# Patient Record
Sex: MALE | Race: WHITE | Employment: OTHER | URBAN - METROPOLITAN AREA
[De-identification: names, ages, dates, MRNs, and addresses within clinical notes are randomized per-mention and may not be internally consistent; named-entity substitution may affect disease eponyms.]

---

## 2017-04-13 ENCOUNTER — OFFICE VISIT (OUTPATIENT)
Dept: ORTHOPEDIC SURGERY | Age: 67
End: 2017-04-13

## 2017-04-13 VITALS
SYSTOLIC BLOOD PRESSURE: 122 MMHG | BODY MASS INDEX: 27 KG/M2 | DIASTOLIC BLOOD PRESSURE: 70 MMHG | HEIGHT: 66 IN | WEIGHT: 168 LBS

## 2017-04-13 DIAGNOSIS — M25.512 ACUTE PAIN OF LEFT SHOULDER: Primary | ICD-10-CM

## 2017-04-13 PROCEDURE — G8427 DOCREV CUR MEDS BY ELIG CLIN: HCPCS | Performed by: ORTHOPAEDIC SURGERY

## 2017-04-13 PROCEDURE — G8420 CALC BMI NORM PARAMETERS: HCPCS | Performed by: ORTHOPAEDIC SURGERY

## 2017-04-13 PROCEDURE — 1123F ACP DISCUSS/DSCN MKR DOCD: CPT | Performed by: ORTHOPAEDIC SURGERY

## 2017-04-13 PROCEDURE — 73030 X-RAY EXAM OF SHOULDER: CPT | Performed by: ORTHOPAEDIC SURGERY

## 2017-04-13 PROCEDURE — 1036F TOBACCO NON-USER: CPT | Performed by: ORTHOPAEDIC SURGERY

## 2017-04-13 PROCEDURE — 4040F PNEUMOC VAC/ADMIN/RCVD: CPT | Performed by: ORTHOPAEDIC SURGERY

## 2017-04-13 PROCEDURE — 3017F COLORECTAL CA SCREEN DOC REV: CPT | Performed by: ORTHOPAEDIC SURGERY

## 2017-04-13 PROCEDURE — 99204 OFFICE O/P NEW MOD 45 MIN: CPT | Performed by: ORTHOPAEDIC SURGERY

## 2017-04-13 RX ORDER — SIMVASTATIN 20 MG
20 TABLET ORAL NIGHTLY
COMMUNITY
Start: 2017-02-28

## 2017-04-13 RX ORDER — CLOBETASOL PROPIONATE 0.46 MG/ML
SOLUTION TOPICAL
COMMUNITY
Start: 2017-03-04

## 2017-04-13 RX ORDER — LOSARTAN POTASSIUM 25 MG/1
25 TABLET ORAL NIGHTLY
COMMUNITY
Start: 2017-04-11

## 2017-04-13 RX ORDER — CICLOPIROX 1 G/100ML
SHAMPOO TOPICAL
COMMUNITY
Start: 2017-02-14

## 2017-04-13 RX ORDER — CARVEDILOL 25 MG/1
25 TABLET ORAL 2 TIMES DAILY
COMMUNITY
Start: 2017-02-21

## 2017-04-26 ENCOUNTER — OFFICE VISIT (OUTPATIENT)
Dept: ORTHOPEDIC SURGERY | Age: 67
End: 2017-04-26

## 2017-04-26 DIAGNOSIS — M75.02 ADHESIVE CAPSULITIS OF LEFT SHOULDER: Primary | ICD-10-CM

## 2017-04-26 PROCEDURE — 3017F COLORECTAL CA SCREEN DOC REV: CPT | Performed by: ORTHOPAEDIC SURGERY

## 2017-04-26 PROCEDURE — 1123F ACP DISCUSS/DSCN MKR DOCD: CPT | Performed by: ORTHOPAEDIC SURGERY

## 2017-04-26 PROCEDURE — 4040F PNEUMOC VAC/ADMIN/RCVD: CPT | Performed by: ORTHOPAEDIC SURGERY

## 2017-04-26 PROCEDURE — G8427 DOCREV CUR MEDS BY ELIG CLIN: HCPCS | Performed by: ORTHOPAEDIC SURGERY

## 2017-04-26 PROCEDURE — G8420 CALC BMI NORM PARAMETERS: HCPCS | Performed by: ORTHOPAEDIC SURGERY

## 2017-04-26 PROCEDURE — 99214 OFFICE O/P EST MOD 30 MIN: CPT | Performed by: ORTHOPAEDIC SURGERY

## 2017-04-26 PROCEDURE — 1036F TOBACCO NON-USER: CPT | Performed by: ORTHOPAEDIC SURGERY

## 2017-04-26 RX ORDER — TRIAMTERENE AND HYDROCHLOROTHIAZIDE 37.5; 25 MG/1; MG/1
1 CAPSULE ORAL EVERY MORNING
COMMUNITY
Start: 2017-04-17

## 2017-05-01 DIAGNOSIS — M75.02 ADHESIVE CAPSULITIS OF LEFT SHOULDER: Primary | ICD-10-CM

## 2017-05-02 ENCOUNTER — TELEPHONE (OUTPATIENT)
Dept: ORTHOPEDIC SURGERY | Age: 67
End: 2017-05-02

## 2017-05-04 DIAGNOSIS — M75.02 ADHESIVE CAPSULITIS OF LEFT SHOULDER: Primary | ICD-10-CM

## 2017-05-04 RX ORDER — MELOXICAM 15 MG/1
15 TABLET ORAL DAILY
Qty: 30 TABLET | Refills: 3 | Status: SHIPPED | OUTPATIENT
Start: 2017-05-04 | End: 2017-10-16

## 2017-05-04 RX ORDER — OXYCODONE HYDROCHLORIDE 10 MG/1
10 TABLET ORAL EVERY 8 HOURS PRN
Qty: 40 TABLET | Refills: 0 | Status: SHIPPED | OUTPATIENT
Start: 2017-05-04 | End: 2017-05-11

## 2017-05-09 ENCOUNTER — HOSPITAL ENCOUNTER (OUTPATIENT)
Dept: SURGERY | Age: 67
Discharge: HOME | End: 2017-05-09
Attending: ORTHOPAEDIC SURGERY | Admitting: ORTHOPAEDIC SURGERY

## 2017-05-09 ENCOUNTER — HOSPITAL ENCOUNTER (OUTPATIENT)
Dept: PHYSICAL THERAPY | Age: 67
Discharge: HOME | End: 2017-05-09
Admitting: ORTHOPAEDIC SURGERY

## 2017-05-09 VITALS
RESPIRATION RATE: 16 BRPM | HEART RATE: 55 BPM | BODY MASS INDEX: 26.82 KG/M2 | TEMPERATURE: 97.8 F | OXYGEN SATURATION: 100 % | WEIGHT: 166.9 LBS | SYSTOLIC BLOOD PRESSURE: 97 MMHG | HEIGHT: 66 IN | DIASTOLIC BLOOD PRESSURE: 73 MMHG

## 2017-05-09 LAB
ANION GAP SERPL CALCULATED.3IONS-SCNC: 12 MMOL/L (ref 3–16)
BUN BLDV-MCNC: 16 MG/DL (ref 7–20)
CALCIUM SERPL-MCNC: 9.2 MG/DL (ref 8.3–10.6)
CHLORIDE BLD-SCNC: 99 MMOL/L (ref 99–110)
CO2: 29 MMOL/L (ref 21–32)
CREAT SERPL-MCNC: 0.8 MG/DL (ref 0.8–1.3)
GFR AFRICAN AMERICAN: >60
GFR NON-AFRICAN AMERICAN: >60
GLUCOSE BLD-MCNC: 105 MG/DL (ref 70–99)
HCT VFR BLD CALC: 41.5 % (ref 40.5–52.5)
HEMOGLOBIN: 13.8 G/DL (ref 13.5–17.5)
MCH RBC QN AUTO: 30.9 PG (ref 26–34)
MCHC RBC AUTO-ENTMCNC: 33.2 G/DL (ref 31–36)
MCV RBC AUTO: 93 FL (ref 80–100)
PDW BLD-RTO: 14.6 % (ref 12.4–15.4)
PLATELET # BLD: 214 K/UL (ref 135–450)
PMV BLD AUTO: 7.4 FL (ref 5–10.5)
POTASSIUM SERPL-SCNC: 3.8 MMOL/L (ref 3.5–5.1)
RBC # BLD: 4.47 M/UL (ref 4.2–5.9)
SODIUM BLD-SCNC: 140 MMOL/L (ref 136–145)
WBC # BLD: 6.9 K/UL (ref 4–11)

## 2017-05-09 RX ORDER — SODIUM CHLORIDE 9 MG/ML
INJECTION, SOLUTION INTRAVENOUS CONTINUOUS
Status: DISCONTINUED | OUTPATIENT
Start: 2017-05-09 | End: 2017-05-10 | Stop reason: HOSPADM

## 2017-05-09 RX ORDER — ACETAMINOPHEN 325 MG/1
650 TABLET ORAL EVERY 4 HOURS PRN
Status: DISCONTINUED | OUTPATIENT
Start: 2017-05-09 | End: 2017-05-10 | Stop reason: HOSPADM

## 2017-05-09 RX ORDER — ONDANSETRON 2 MG/ML
4 INJECTION INTRAMUSCULAR; INTRAVENOUS
Status: ACTIVE | OUTPATIENT
Start: 2017-05-09 | End: 2017-05-09

## 2017-05-09 RX ORDER — HYDROCODONE BITARTRATE AND ACETAMINOPHEN 5; 325 MG/1; MG/1
1 TABLET ORAL
Status: ACTIVE | OUTPATIENT
Start: 2017-05-09 | End: 2017-05-09

## 2017-05-09 RX ORDER — LIDOCAINE HYDROCHLORIDE 10 MG/ML
1 INJECTION, SOLUTION EPIDURAL; INFILTRATION; INTRACAUDAL; PERINEURAL
Status: ACTIVE | OUTPATIENT
Start: 2017-05-09 | End: 2017-05-09

## 2017-05-09 RX ORDER — SODIUM CHLORIDE, SODIUM LACTATE, POTASSIUM CHLORIDE, CALCIUM CHLORIDE 600; 310; 30; 20 MG/100ML; MG/100ML; MG/100ML; MG/100ML
INJECTION, SOLUTION INTRAVENOUS CONTINUOUS
Status: DISCONTINUED | OUTPATIENT
Start: 2017-05-09 | End: 2017-05-10 | Stop reason: HOSPADM

## 2017-05-09 RX ORDER — CEFAZOLIN SODIUM 2 G/100ML
2 INJECTION, SOLUTION INTRAVENOUS
Status: COMPLETED | OUTPATIENT
Start: 2017-05-09 | End: 2017-05-09

## 2017-05-09 RX ORDER — HYDROMORPHONE HCL 110MG/55ML
0.25 PATIENT CONTROLLED ANALGESIA SYRINGE INTRAVENOUS EVERY 5 MIN PRN
Status: DISCONTINUED | OUTPATIENT
Start: 2017-05-09 | End: 2017-05-10 | Stop reason: HOSPADM

## 2017-05-09 RX ORDER — HYDROMORPHONE HCL 110MG/55ML
0.5 PATIENT CONTROLLED ANALGESIA SYRINGE INTRAVENOUS EVERY 5 MIN PRN
Status: DISCONTINUED | OUTPATIENT
Start: 2017-05-09 | End: 2017-05-10 | Stop reason: HOSPADM

## 2017-05-09 RX ORDER — OXYCODONE HYDROCHLORIDE 5 MG/1
5 TABLET ORAL
Status: COMPLETED | OUTPATIENT
Start: 2017-05-09 | End: 2017-05-09

## 2017-05-09 RX ADMIN — Medication 0.5 MG: at 08:54

## 2017-05-09 RX ADMIN — SODIUM CHLORIDE, SODIUM LACTATE, POTASSIUM CHLORIDE, CALCIUM CHLORIDE: 600; 310; 30; 20 INJECTION, SOLUTION INTRAVENOUS at 06:46

## 2017-05-09 RX ADMIN — CEFAZOLIN SODIUM 2 G: 2 INJECTION, SOLUTION INTRAVENOUS at 06:55

## 2017-05-09 RX ADMIN — OXYCODONE HYDROCHLORIDE 5 MG: 5 TABLET ORAL at 09:24

## 2017-05-09 RX ADMIN — Medication 0.5 MG: at 08:40

## 2017-05-09 ASSESSMENT — PAIN SCALES - GENERAL
PAINLEVEL_OUTOF10: 8
PAINLEVEL_OUTOF10: 5
PAINLEVEL_OUTOF10: 2

## 2017-05-09 ASSESSMENT — PAIN DESCRIPTION - DESCRIPTORS: DESCRIPTORS: ACHING

## 2017-05-09 ASSESSMENT — PAIN DESCRIPTION - PAIN TYPE
TYPE: ACUTE PAIN
TYPE: SURGICAL PAIN
TYPE: SURGICAL PAIN

## 2017-05-09 ASSESSMENT — PAIN DESCRIPTION - ORIENTATION: ORIENTATION: LEFT

## 2017-05-09 ASSESSMENT — PAIN DESCRIPTION - LOCATION: LOCATION: SHOULDER

## 2017-05-09 ASSESSMENT — PAIN - FUNCTIONAL ASSESSMENT: PAIN_FUNCTIONAL_ASSESSMENT: 0-10

## 2017-05-10 ENCOUNTER — HOSPITAL ENCOUNTER (OUTPATIENT)
Dept: PHYSICAL THERAPY | Age: 67
Discharge: HOME | End: 2017-05-10
Admitting: ORTHOPAEDIC SURGERY

## 2017-05-11 ENCOUNTER — HOSPITAL ENCOUNTER (OUTPATIENT)
Dept: PHYSICAL THERAPY | Age: 67
Discharge: HOME | End: 2017-05-11
Admitting: ORTHOPAEDIC SURGERY

## 2017-05-12 ENCOUNTER — HOSPITAL ENCOUNTER (OUTPATIENT)
Dept: PHYSICAL THERAPY | Age: 67
Discharge: HOME | End: 2017-05-12
Admitting: ORTHOPAEDIC SURGERY

## 2017-05-15 ENCOUNTER — HOSPITAL ENCOUNTER (OUTPATIENT)
Dept: PHYSICAL THERAPY | Age: 67
Discharge: HOME | End: 2017-05-15
Admitting: ORTHOPAEDIC SURGERY

## 2017-05-17 ENCOUNTER — OFFICE VISIT (OUTPATIENT)
Dept: ORTHOPEDIC SURGERY | Age: 67
End: 2017-05-17

## 2017-05-17 ENCOUNTER — HOSPITAL ENCOUNTER (OUTPATIENT)
Dept: PHYSICAL THERAPY | Age: 67
Discharge: HOME | End: 2017-05-17
Admitting: ORTHOPAEDIC SURGERY

## 2017-05-17 DIAGNOSIS — M75.02 ADHESIVE CAPSULITIS OF LEFT SHOULDER: Primary | ICD-10-CM

## 2017-05-17 PROCEDURE — 99024 POSTOP FOLLOW-UP VISIT: CPT | Performed by: ORTHOPAEDIC SURGERY

## 2017-05-19 ENCOUNTER — HOSPITAL ENCOUNTER (OUTPATIENT)
Dept: PHYSICAL THERAPY | Age: 67
Discharge: HOME | End: 2017-05-19
Admitting: ORTHOPAEDIC SURGERY

## 2017-05-22 ENCOUNTER — HOSPITAL ENCOUNTER (OUTPATIENT)
Dept: PHYSICAL THERAPY | Age: 67
Discharge: HOME | End: 2017-05-22
Admitting: ORTHOPAEDIC SURGERY

## 2017-05-24 ENCOUNTER — HOSPITAL ENCOUNTER (OUTPATIENT)
Dept: PHYSICAL THERAPY | Age: 67
Discharge: HOME | End: 2017-05-24
Admitting: ORTHOPAEDIC SURGERY

## 2017-05-26 ENCOUNTER — HOSPITAL ENCOUNTER (OUTPATIENT)
Dept: PHYSICAL THERAPY | Age: 67
Discharge: HOME | End: 2017-05-26
Admitting: ORTHOPAEDIC SURGERY

## 2017-05-30 ENCOUNTER — HOSPITAL ENCOUNTER (OUTPATIENT)
Dept: PHYSICAL THERAPY | Age: 67
Discharge: HOME | End: 2017-05-30
Admitting: ORTHOPAEDIC SURGERY

## 2017-06-05 ENCOUNTER — OFFICE VISIT (OUTPATIENT)
Dept: ORTHOPEDIC SURGERY | Age: 67
End: 2017-06-05

## 2017-06-05 VITALS
WEIGHT: 168 LBS | BODY MASS INDEX: 27 KG/M2 | DIASTOLIC BLOOD PRESSURE: 70 MMHG | HEIGHT: 66 IN | SYSTOLIC BLOOD PRESSURE: 124 MMHG

## 2017-06-05 DIAGNOSIS — M25.522 LEFT ELBOW PAIN: Primary | ICD-10-CM

## 2017-06-05 PROCEDURE — 4040F PNEUMOC VAC/ADMIN/RCVD: CPT | Performed by: ORTHOPAEDIC SURGERY

## 2017-06-05 PROCEDURE — 1123F ACP DISCUSS/DSCN MKR DOCD: CPT | Performed by: ORTHOPAEDIC SURGERY

## 2017-06-05 PROCEDURE — G8427 DOCREV CUR MEDS BY ELIG CLIN: HCPCS | Performed by: ORTHOPAEDIC SURGERY

## 2017-06-05 PROCEDURE — 3017F COLORECTAL CA SCREEN DOC REV: CPT | Performed by: ORTHOPAEDIC SURGERY

## 2017-06-05 PROCEDURE — 73070 X-RAY EXAM OF ELBOW: CPT | Performed by: ORTHOPAEDIC SURGERY

## 2017-06-05 PROCEDURE — 99214 OFFICE O/P EST MOD 30 MIN: CPT | Performed by: ORTHOPAEDIC SURGERY

## 2017-06-05 PROCEDURE — G8420 CALC BMI NORM PARAMETERS: HCPCS | Performed by: ORTHOPAEDIC SURGERY

## 2017-06-05 PROCEDURE — 1036F TOBACCO NON-USER: CPT | Performed by: ORTHOPAEDIC SURGERY

## 2017-06-05 RX ORDER — PREDNISONE 10 MG/1
TABLET ORAL
Qty: 30 TABLET | Refills: 0 | Status: SHIPPED | OUTPATIENT
Start: 2017-06-05 | End: 2017-10-16

## 2017-06-07 ENCOUNTER — HOSPITAL ENCOUNTER (OUTPATIENT)
Dept: PHYSICAL THERAPY | Age: 67
Discharge: HOME | End: 2017-06-07
Admitting: ORTHOPAEDIC SURGERY

## 2017-06-09 ENCOUNTER — HOSPITAL ENCOUNTER (OUTPATIENT)
Dept: PHYSICAL THERAPY | Age: 67
Discharge: HOME | End: 2017-06-09
Admitting: ORTHOPAEDIC SURGERY

## 2017-06-13 ENCOUNTER — HOSPITAL ENCOUNTER (OUTPATIENT)
Dept: PHYSICAL THERAPY | Age: 67
Discharge: HOME | End: 2017-06-13
Admitting: ORTHOPAEDIC SURGERY

## 2017-06-14 DIAGNOSIS — M75.02 ADHESIVE CAPSULITIS OF LEFT SHOULDER: Primary | ICD-10-CM

## 2017-06-14 RX ORDER — HYDROCODONE BITARTRATE AND ACETAMINOPHEN 5; 325 MG/1; MG/1
1 TABLET ORAL EVERY 8 HOURS PRN
Qty: 20 TABLET | Refills: 0 | Status: SHIPPED | OUTPATIENT
Start: 2017-06-14 | End: 2017-06-21

## 2017-06-16 ENCOUNTER — HOSPITAL ENCOUNTER (OUTPATIENT)
Dept: PHYSICAL THERAPY | Age: 67
Discharge: HOME | End: 2017-06-16
Admitting: ORTHOPAEDIC SURGERY

## 2017-06-22 ENCOUNTER — HOSPITAL ENCOUNTER (OUTPATIENT)
Dept: PHYSICAL THERAPY | Age: 67
Discharge: HOME | End: 2017-06-22
Admitting: ORTHOPAEDIC SURGERY

## 2017-07-10 ENCOUNTER — OFFICE VISIT (OUTPATIENT)
Dept: ORTHOPEDIC SURGERY | Age: 67
End: 2017-07-10

## 2017-07-10 DIAGNOSIS — M75.02 ADHESIVE CAPSULITIS OF LEFT SHOULDER: Primary | ICD-10-CM

## 2017-07-10 PROCEDURE — G8419 CALC BMI OUT NRM PARAM NOF/U: HCPCS | Performed by: ORTHOPAEDIC SURGERY

## 2017-07-10 PROCEDURE — 4040F PNEUMOC VAC/ADMIN/RCVD: CPT | Performed by: ORTHOPAEDIC SURGERY

## 2017-07-10 PROCEDURE — 3017F COLORECTAL CA SCREEN DOC REV: CPT | Performed by: ORTHOPAEDIC SURGERY

## 2017-07-10 PROCEDURE — 99213 OFFICE O/P EST LOW 20 MIN: CPT | Performed by: ORTHOPAEDIC SURGERY

## 2017-07-10 PROCEDURE — 1123F ACP DISCUSS/DSCN MKR DOCD: CPT | Performed by: ORTHOPAEDIC SURGERY

## 2017-07-10 PROCEDURE — G8427 DOCREV CUR MEDS BY ELIG CLIN: HCPCS | Performed by: ORTHOPAEDIC SURGERY

## 2017-07-10 PROCEDURE — 1036F TOBACCO NON-USER: CPT | Performed by: ORTHOPAEDIC SURGERY

## 2017-07-11 ENCOUNTER — HOSPITAL ENCOUNTER (OUTPATIENT)
Dept: PHYSICAL THERAPY | Age: 67
Discharge: HOME | End: 2017-07-11
Admitting: ORTHOPAEDIC SURGERY

## 2017-07-26 ENCOUNTER — HOSPITAL ENCOUNTER (OUTPATIENT)
Dept: PHYSICAL THERAPY | Age: 67
Discharge: HOME | End: 2017-07-26
Admitting: ORTHOPAEDIC SURGERY

## 2017-08-02 ENCOUNTER — HOSPITAL ENCOUNTER (OUTPATIENT)
Dept: PHYSICAL THERAPY | Age: 67
Discharge: HOME | End: 2017-08-02
Admitting: ORTHOPAEDIC SURGERY

## 2017-08-17 ENCOUNTER — HOSPITAL ENCOUNTER (OUTPATIENT)
Dept: PHYSICAL THERAPY | Age: 67
Discharge: HOME | End: 2017-08-17
Admitting: ORTHOPAEDIC SURGERY

## 2017-08-23 ENCOUNTER — OFFICE VISIT (OUTPATIENT)
Dept: ORTHOPEDIC SURGERY | Age: 67
End: 2017-08-23

## 2017-08-23 VITALS — BODY MASS INDEX: 27 KG/M2 | WEIGHT: 168 LBS | HEIGHT: 66 IN

## 2017-08-23 DIAGNOSIS — M19.012 GLENOHUMERAL ARTHRITIS, LEFT: Primary | ICD-10-CM

## 2017-08-23 PROCEDURE — G8427 DOCREV CUR MEDS BY ELIG CLIN: HCPCS | Performed by: ORTHOPAEDIC SURGERY

## 2017-08-23 PROCEDURE — G8419 CALC BMI OUT NRM PARAM NOF/U: HCPCS | Performed by: ORTHOPAEDIC SURGERY

## 2017-08-23 PROCEDURE — 99213 OFFICE O/P EST LOW 20 MIN: CPT | Performed by: ORTHOPAEDIC SURGERY

## 2017-08-23 PROCEDURE — 3017F COLORECTAL CA SCREEN DOC REV: CPT | Performed by: ORTHOPAEDIC SURGERY

## 2017-08-23 PROCEDURE — 4040F PNEUMOC VAC/ADMIN/RCVD: CPT | Performed by: ORTHOPAEDIC SURGERY

## 2017-08-23 PROCEDURE — 1123F ACP DISCUSS/DSCN MKR DOCD: CPT | Performed by: ORTHOPAEDIC SURGERY

## 2017-08-23 PROCEDURE — 1036F TOBACCO NON-USER: CPT | Performed by: ORTHOPAEDIC SURGERY

## 2017-08-23 RX ORDER — CALCIPOTRIENE 0.05 MG/ML
SOLUTION TOPICAL
COMMUNITY
Start: 2017-08-07

## 2017-08-23 RX ORDER — SERTRALINE HYDROCHLORIDE 100 MG/1
TABLET, FILM COATED ORAL DAILY
COMMUNITY
Start: 2017-06-28

## 2017-08-23 RX ORDER — HYDROCODONE BITARTRATE AND ACETAMINOPHEN 5; 325 MG/1; MG/1
TABLET ORAL
COMMUNITY
Start: 2017-06-22 | End: 2017-10-16

## 2017-09-18 ENCOUNTER — OFFICE VISIT (OUTPATIENT)
Dept: ORTHOPEDIC SURGERY | Age: 67
End: 2017-09-18

## 2017-09-18 VITALS
BODY MASS INDEX: 25.9 KG/M2 | WEIGHT: 165 LBS | HEART RATE: 79 BPM | HEIGHT: 67 IN | SYSTOLIC BLOOD PRESSURE: 122 MMHG | DIASTOLIC BLOOD PRESSURE: 70 MMHG

## 2017-09-18 DIAGNOSIS — M19.90 ARTHRITIS: ICD-10-CM

## 2017-09-18 PROCEDURE — G8417 CALC BMI ABV UP PARAM F/U: HCPCS | Performed by: PHYSICIAN ASSISTANT

## 2017-09-18 PROCEDURE — L3670 SO ACRO/CLAV CAN WEB PRE OTS: HCPCS | Performed by: PHYSICIAN ASSISTANT

## 2017-09-18 PROCEDURE — 4040F PNEUMOC VAC/ADMIN/RCVD: CPT | Performed by: PHYSICIAN ASSISTANT

## 2017-09-18 PROCEDURE — 3017F COLORECTAL CA SCREEN DOC REV: CPT | Performed by: PHYSICIAN ASSISTANT

## 2017-09-18 PROCEDURE — 1036F TOBACCO NON-USER: CPT | Performed by: PHYSICIAN ASSISTANT

## 2017-09-18 PROCEDURE — 99214 OFFICE O/P EST MOD 30 MIN: CPT | Performed by: PHYSICIAN ASSISTANT

## 2017-09-18 PROCEDURE — G8427 DOCREV CUR MEDS BY ELIG CLIN: HCPCS | Performed by: PHYSICIAN ASSISTANT

## 2017-10-13 LAB
ABO/RH: NORMAL
ANTIBODY SCREEN: NORMAL
APTT: 30.7 SEC (ref 24.1–34.9)
BASOPHILS ABSOLUTE: 0.1 K/UL (ref 0–0.2)
BASOPHILS RELATIVE PERCENT: 0.9 %
BILIRUBIN URINE: NEGATIVE
BLOOD, URINE: NEGATIVE
CLARITY: CLEAR
COLOR: YELLOW
EKG ATRIAL RATE: 51 BPM
EKG DIAGNOSIS: NORMAL
EKG P AXIS: 41 DEGREES
EKG P-R INTERVAL: 156 MS
EKG Q-T INTERVAL: 422 MS
EKG QRS DURATION: 78 MS
EKG QTC CALCULATION (BAZETT): 388 MS
EKG R AXIS: 6 DEGREES
EKG T AXIS: 90 DEGREES
EKG VENTRICULAR RATE: 51 BPM
EOSINOPHILS ABSOLUTE: 0.7 K/UL (ref 0–0.6)
EOSINOPHILS RELATIVE PERCENT: 9.3 %
GLUCOSE URINE: NEGATIVE MG/DL
HCT VFR BLD CALC: 42.2 % (ref 40.5–52.5)
HEMOGLOBIN: 14.1 G/DL (ref 13.5–17.5)
INR BLD: 1.01 (ref 0.85–1.15)
KETONES, URINE: NEGATIVE MG/DL
LEUKOCYTE ESTERASE, URINE: NEGATIVE
LYMPHOCYTES ABSOLUTE: 2.1 K/UL (ref 1–5.1)
LYMPHOCYTES RELATIVE PERCENT: 26.2 %
MCH RBC QN AUTO: 31 PG (ref 26–34)
MCHC RBC AUTO-ENTMCNC: 33.5 G/DL (ref 31–36)
MCV RBC AUTO: 92.5 FL (ref 80–100)
MICROSCOPIC EXAMINATION: NORMAL
MONOCYTES ABSOLUTE: 0.5 K/UL (ref 0–1.3)
MONOCYTES RELATIVE PERCENT: 6.3 %
NEUTROPHILS ABSOLUTE: 4.5 K/UL (ref 1.7–7.7)
NEUTROPHILS RELATIVE PERCENT: 57.3 %
NITRITE, URINE: NEGATIVE
PDW BLD-RTO: 14.1 % (ref 12.4–15.4)
PH UA: 7
PLATELET # BLD: 219 K/UL (ref 135–450)
PMV BLD AUTO: 7.7 FL (ref 5–10.5)
PROTEIN UA: NEGATIVE MG/DL
PROTHROMBIN TIME: 11.4 SEC (ref 9.6–13)
RBC # BLD: 4.57 M/UL (ref 4.2–5.9)
SPECIFIC GRAVITY UA: 1.01
URINE TYPE: NORMAL
UROBILINOGEN, URINE: 0.2 E.U./DL
WBC # BLD: 7.9 K/UL (ref 4–11)

## 2017-10-14 LAB — URINE CULTURE, ROUTINE: NORMAL

## 2017-10-23 ENCOUNTER — HOSPITAL ENCOUNTER (OUTPATIENT)
Dept: SURGERY | Age: 67
Discharge: HOME | End: 2017-10-13
Attending: ORTHOPAEDIC SURGERY | Admitting: ORTHOPAEDIC SURGERY

## 2017-10-23 PROBLEM — I10 HTN (HYPERTENSION): Status: ACTIVE | Noted: 2017-10-23

## 2017-10-23 PROBLEM — E78.5 HLD (HYPERLIPIDEMIA): Status: ACTIVE | Noted: 2017-10-23

## 2017-10-23 PROBLEM — I25.10 CAD (CORONARY ARTERY DISEASE): Status: ACTIVE | Noted: 2017-10-23

## 2017-10-24 PROBLEM — Z96.612 STATUS POST TOTAL REPLACEMENT OF LEFT SHOULDER: Status: ACTIVE | Noted: 2017-10-24

## 2017-11-03 ENCOUNTER — OFFICE VISIT (OUTPATIENT)
Dept: ORTHOPEDIC SURGERY | Age: 67
End: 2017-11-03

## 2017-11-03 VITALS — BODY MASS INDEX: 27.85 KG/M2 | HEIGHT: 67 IN | WEIGHT: 177.47 LBS

## 2017-11-03 DIAGNOSIS — Z96.612 STATUS POST TOTAL REPLACEMENT OF LEFT SHOULDER: Primary | ICD-10-CM

## 2017-11-03 DIAGNOSIS — Z96.612 HISTORY OF TOTAL REPLACEMENT OF LEFT SHOULDER JOINT: ICD-10-CM

## 2017-11-03 PROCEDURE — 99024 POSTOP FOLLOW-UP VISIT: CPT | Performed by: ORTHOPAEDIC SURGERY

## 2017-11-08 ENCOUNTER — HOSPITAL ENCOUNTER (OUTPATIENT)
Dept: PHYSICAL THERAPY | Age: 67
Discharge: HOME | End: 2017-11-30
Admitting: ORTHOPAEDIC SURGERY

## 2017-11-08 NOTE — FLOWSHEET NOTE
[address] [facility] [address] [facility]  Phone: [phone] Fax: [phone]      Physical Therapy Daily Treatment Note  Date:  2017    Patient Name:  Dutch Tesfaye    :  1950  MRN: [id number]  Restrictions/Precautions:    Medical/Treatment Diagnosis Information:  · Diagnosis: s/p L shoulder  · Treatment Diagnosis: status post total replacement of left shoulder Z96.612, I20.30  Insurance/Certification information:  PT Insurance Information: Bertin Stuart [id number]  Physician Information:  Referring Practitioner: Dr. Karolina Red of care signed (Y/N):     Date of Patient follow up with Physician:     G-Code (if applicable):      Date G-Code Applied:    PT G-Codes  Functional Assessment Tool Used: QuickDASH  Score: 56.81%   Functional Limitation: Carrying, moving and handling objects  Carrying, Moving and Handling Objects Current Status (): At least 40 percent but less than 60 percent impaired, limited or restricted  Carrying, Moving and Handling Objects Goal Status ():  At least 20 percent but less than 40 percent impaired, limited or restricted    Progress Note: [x]  Yes  []  No  Next due by: Visit #10      Latex Allergy:  [x]NO      []YES  Preferred Language for Healthcare:   [x]English       []other:    Visit # Insurance Allowable   1 Medicare/[facility] 1807.     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    Exercises/Interventions:   Therapeutic Ex Sets/sec Reps Notes   UBE      Cane AAROM flex/press      3 way Isomet      T- band Row/pinch      T- band lower pinch      T- band ER activation      Supine SA punch      SL ER      Prone Rows/HAB/ext      Seat Table slides/Wall Slides 10s 10    Seated HH  Depression      No Money      Standing flex/scap      Standing Punch      Lawnmower      isometric scap squeeze 5s 20                      Manual Intervention      Shld /GH Mobs 10     Post Cap mobs      Thoracic/Rib manipualtion      CT MT/Mobs      PROM MT      Elbow mobs            NMR re-education      T-spine Ext      GH depress/compress      Scap/GH NMR      Body blade      Wall ball roll      Wall Ball bounce      Ball drops      Nancy Scap Bio          Therapeutic Exercise and NMR EXR  [x] (69650) Provided verbal/tactile cueing for activities related to strengthening, flexibility, endurance, ROM  for improvements in scapular, scapulothoracic and UE control with self care, reaching, carrying, lifting, house/yardwork, driving/computer work.    [] (81806) Provided verbal/tactile cueing for activities related to improving balance, coordination, kinesthetic sense, posture, motor skill, proprioception  to assist with  scapular, scapulothoracic and UE control with self care, reaching, carrying, lifting, house/yardwork, driving/computer work. Therapeutic Activities:    [] (73452 or 64116) Provided verbal/tactile cueing for activities related to improving balance, coordination, kinesthetic sense, posture, motor skill, proprioception and motor activation to allow for proper function of scapular, scapulothoracic and UE control with self care, carrying, lifting, driving/computer work.      Home Exercise Program:    [x] (16060) Reviewed/Progressed HEP activities related to strengthening, flexibility, endurance, ROM of scapular, scapulothoracic and UE control with self care, reaching, carrying, lifting, house/yardwork, driving/computer work  [] (64174) Reviewed/Progressed HEP activities related to improving balance, coordination, kinesthetic sense, posture, motor skill, proprioception of scapular, scapulothoracic and UE control with self care, reaching, carrying, lifting, house/yardwork, driving/computer work      Manual Treatments:  PROM / STM / Oscillations-Mobs:  G-I, II, III, IV (PA's, Inf., Post.)  [x] (79057) weeks     Progression Towards Functional goals:  [] Patient is progressing as expected towards functional goals listed. [] Progression is slowed due to complexities listed. [] Progression has been slowed due to co-morbidities.   [x] Plan just implemented, too soon to assess goals progression  [] Other:     ASSESSMENT:  See eval    Return to Play: (if applicable)   []  Stage 1: Intro to Strength   []  Stage 2: Dynamic Strength and Intro to Plyometrics   []  Stage 3: Advanced Plyometrics and Intro to Throwing   []  Stage 4: Sport specific Training/Return to Sport     []  Ready to Return to Play, [employer]   []  Not Ready for Return to Sports   Comments:      Treatment/Activity Tolerance:  [x] Patient tolerated treatment well [] Patient limited by fatique  [] Patient limited by pain  [] Patient limited by other medical complications  [] Other:     Prognosis: [x] Good [] Fair  [] Poor    Patient Requires Follow-up: [x] Yes  [] No    PLAN: See eval  [] Continue per plan of care [] Alter current plan (see comments)  [x] Plan of care initiated [] Hold pending MD visit [] Discharge    Electronically signed by: Angelito Vail PT

## 2017-11-08 NOTE — PROGRESS NOTES
Disability Index:PT G-Codes  Functional Assessment Tool Used: QuickDASH  Score: 56.81%   Functional Limitation: Carrying, moving and handling objects  Carrying, Moving and Handling Objects Current Status (): At least 40 percent but less than 60 percent impaired, limited or restricted  Carrying, Moving and Handling Objects Goal Status (): At least 20 percent but less than 40 percent impaired, limited or restricted    Pain Scale: 5-6/10  Easing factors: rest, ice, medication  Provocative factors: sleep, LUE use for daily functions, driving, OH reach, golf     Type: []Constant   [x]Intermittent  []Radiating [x]Localized []other:     Numbness/Tingling: n/a    Occupation/School: business owner of curtain store    Living Status/Prior Level of Function: Independent with ADLs and IADLs, improve LUE daily functional activities, recreational activities, driving, UE OH reach and use for increased physical activities including work duties and golf. OBJECTIVE:     CERV ROM     Cervical Flexion     Cervical Extension     Cervical SB     Cervical rotation          ROM Left(passive) Right   Shoulder Flex 95    Shoulder Abd 80    Shoulder ER 14    Shoulder IR 0                   Strength  Left Right   Shoulder Flex nt    Shoulder Scap nt    Shoulder ER nt    Shoulder IR nt                Reflexes/Sensation:    [x]Dermatomes/Myotomes intact    [x]Reflexes equal and normal bilaterally   []Other:    Joint mobility:    []Normal    [x]Hypo   []Hyper    Palpation: tender along incision sites, anterior shoulder    Functional Mobility/Transfers: none    Posture: rounded shoulders    Bandages/Dressings/Incisions: incision sites appear intact and dry without signs of infection    Gait: (include devices/WB status): WNL    Orthopedic Special Tests:  nt                       [x] Patient history, allergies, meds reviewed. Medical chart reviewed. See intake form.      Review Of Systems (ROS):  [x]Performed Review of systems Handling Objects Current Status (): At least 40 percent but less than 60 percent impaired, limited or restricted  Carrying, Moving and Handling Objects Goal Status (): At least 20 percent but less than 40 percent impaired, limited or restricted    ASSESSMENT: Pt demonstrates s/p L shoulder mobility deficits, strength deficits, proprioceptive impairment and overall decreased functional activity tolerance. Pt benefits from manual treatment techniques and education for gradual progressions. Discussed pathology and improving overall tissue healing with mobility in all planes managing pain and effusion. Functional Impairments   [x]Noted spinal or UE joint hypomobility   []Noted spinal or UE joint hypermobility   [x]Decreased UE functional ROM   [x]Decreased UE functional strength   [x]Abnormal reflexes/sensation/myotomal/dermatomal deficits   [x]Decreased RC/scapular/core strength and neuromuscular control   []other:      Functional Activity Limitations (from functional questionnaire and intake)   [x]Reduced ability to tolerate prolonged functional positions   [x]Reduced ability or difficulty with changes of positions or transfers between positions   [x]Reduced ability to maintain good posture and demonstrate good body mechanics with sitting, bending, and lifting   [x] Reduced ability or tolerance with driving and/or computer work   [x]Reduced ability to sleep   [x]Reduced ability to perform lifting, reaching, carrying tasks   [x]Reduced ability to tolerate impact through UE   [x]Reduced ability to reach behind back   [x]Reduced ability to  or hold objects   [x]Reduced ability to throw or toss an object   []other:      Participation Restrictions   [x]Reduced participation in self care activities   [x]Reduced participation in home management activities   [x]Reduced participation in work activities   [x]Reduced participation in social activities.    [x]Reduced participation in sport / recreational activities. Classification:   [x]Signs/symptoms consistent with post-surgical status including decreased ROM, strength and function. []Signs/symptoms consistent with joint sprain/strain   []Signs/symptoms consistent with shoulder impingement   []Signs/symptoms consistent with shoulder/elbow/wrist tendinopathy   []Signs/symptoms consistent with Rotator cuff tear   []Signs/symptoms consistent with labral tear   []Signs/symptoms consistent with postural dysfunction    []Signs/symptoms consistent with Glenohumeral IR Deficit - <45 degrees   [x]Signs/symptoms consistent with facet dysfunction of cervical/thoracic spine    []Signs/symptoms consistent with pathology which may benefit from Dry needling     []other:     Prognosis/Rehab Potential:      []Excellent   [x]Good    []Fair   []Poor    Tolerance of evaluation/treatment:    []Excellent   [x]Good    []Fair   []Poor    Physical Therapy Evaluation Complexity Justification  [x] A history of present problem with:  [] no personal factors and/or comorbidities that impact the plan of care;  [x]1-2 personal factors and/or comorbidities that impact the plan of care  []3 personal factors and/or comorbidities that impact the plan of care  [x] An examination of body systems using standardized tests and measures addressing any of the following: body structures and functions (impairments), activity limitations, and/or participation restrictions;:  [x] a total of 1-2 or more elements   [] a total of 3 or more elements   [] a total of 4 or more elements   [x] A clinical presentation with:  [] stable and/or uncomplicated characteristics   [x] evolving clinical presentation with changing characteristics  [] unstable and unpredictable characteristics;   [x] Clinical decision making of [] low, [x] moderate, [] high complexity using standardized patient assessment instrument and/or measurable assessment of functional outcome.     [] EVAL (LOW) 18170 (typically 20 minutes

## 2017-11-10 ENCOUNTER — HOSPITAL ENCOUNTER (OUTPATIENT)
Dept: PHYSICAL THERAPY | Age: 67
Discharge: HOME | End: 2017-11-10
Admitting: ORTHOPAEDIC SURGERY

## 2017-11-10 NOTE — FLOWSHEET NOTE
[address] [address] [address] [facility], [place] [address]  Phone: [phone] Fax: [phone]      Physical Therapy Daily Treatment Note  Date:  11/10/2017    Patient Name:  Angelo Lawrence    :  1950  MRN: [id number]  Restrictions/Precautions:    Medical/Treatment Diagnosis Information:  · Diagnosis: s/p L shoulder  · Treatment Diagnosis: status post total replacement of left shoulder Z96.612, S88.34  Insurance/Certification information:  PT Insurance Information: Bertin Stuart 142  Physician Information:  Referring Practitioner: Dr. Gloria Doe of care signed (Y/N):     Date of Patient follow up with Physician:     G-Code (if applicable):      Date G-Code Applied:         Progress Note: [x]  Yes  []  No  Next due by: Visit #10      Latex Allergy:  [x]NO      []YES  Preferred Language for Healthcare:   [x]English       []other:    Visit # Insurance Allowable   2 Medicare/[facility] 180     Pain level:  5/10     SUBJECTIVE:  Pt reports \"felt so good after evaluation\".  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    Exercises/Interventions:   Therapeutic Ex Sets/sec Reps Notes   UBE      Cane AAROM flex/press 5s 10    3 way Isomet      T- band Row/pinch      T- band lower pinch      T- band ER activation      Supine SA punch      SL ER      Prone Rows/HAB/ext      Seat Table slides/Wall Slides 10s 10    Seated HH  Depression      No Money      Standing flex/scap      Standing Punch      Lawnmower      isometric scap squeeze 5s 20                      Manual Intervention      Shld /GH Mobs 5     Post Cap mobs      Thoracic/Rib manipualtion      CT MT/Mobs      PROM MT 10     Elbow mobs            NMR re-education      T-spine Ext      GH depress/compress      Scap/GH NMR      Body blade      Wall ball roll      Wall Ball bounce      Ball drops reaching, carrying, lifting, house/yardwork, driving/computer work    Modalities:  Cold pack 10'    Charges:  Timed Code Treatment Minutes: 34   Total Treatment Minutes: 44     [] EVAL (LOW) 36745 (typically 20 minutes face-to-face)  [] EVAL (MOD) 41396 (typically 30 minutes face-to-face)  [] EVAL (HIGH) 53924 (typically 45 minutes face-to-face)  [] RE-EVAL     [x] TA(28731) x  1   [] IONTO  [] NMR (28089) x      [] VASO  [x] Manual (71012) x  1    [] Other:  [] TA x       [] Mech Traction (20120)  [] ES(attended) (01253)      [] ES (un) (95013):     GOALS:  Therapist goals for Patient:   Short Term Goals: To be achieved in: 2 weeks  1. Independent in HEP and progression per patient tolerance, in order to prevent re-injury. 2. Patient will have a decrease in pain to facilitate improvement in movement, function, and ADLs as indicated by Functional Deficits. Long Term Goals: To be achieved in: 6-8 weeks  1. Disability index score of 25% or less for the DASH to assist with reaching prior level of function. (IE 54.81%)  2. Patient will demonstrate increased AROM to symmetrical within 5 degrees to allow for proper joint functioning as indicated by patients Functional Deficits. 3. Patient will demonstrate an increase in Strength to good scapular and core control, w/in 5lbs HHD for UE to allow for proper functional mobility as indicated by patients Functional Deficits. 4. Patient will return to increased physical and daily functional activities without increased symptoms or restriction. 5. Pt will report at least \"mild difficulty\" with question 8 of QuickDASH questionnaire by 6 weeks     Progression Towards Functional goals:  [] Patient is progressing as expected towards functional goals listed. [] Progression is slowed due to complexities listed. [] Progression has been slowed due to co-morbidities.   [x] Plan just implemented, too soon to assess goals progression  [] Other:     ASSESSMENT: Pt is responding well to all manual treatment techniques. Gentle manual techniques performed and AAROM cane OH added for flexion range.     Return to Play: (if applicable)   []  Stage 1: Intro to Strength   []  Stage 2: Dynamic Strength and Intro to Plyometrics   []  Stage 3: Advanced Plyometrics and Intro to Throwing   []  Stage 4: Sport specific Training/Return to Sport     []  Ready to Return to Play, [employer]   []  Not Ready for Return to Sports   Comments:      Treatment/Activity Tolerance:  [x] Patient tolerated treatment well [] Patient limited by fatique  [] Patient limited by pain  [] Patient limited by other medical complications  [] Other:     Prognosis: [x] Good [] Fair  [] Poor    Patient Requires Follow-up: [x] Yes  [] No    PLAN:   [] Continue per plan of care [] Alter current plan (see comments)  [x] Plan of care initiated [] Hold pending MD visit [] Discharge    Electronically signed by: Pop Presley PT

## 2017-11-14 ENCOUNTER — HOSPITAL ENCOUNTER (OUTPATIENT)
Dept: PHYSICAL THERAPY | Age: 67
Discharge: HOME | End: 2017-11-14
Admitting: ORTHOPAEDIC SURGERY

## 2017-11-14 NOTE — FLOWSHEET NOTE
multiangle submaximal isometrics   Body blade      Wall ball roll      Wall Ball bounce      Ball drops      Nancy Scap Bio          Therapeutic Exercise and NMR EXR  [x] (79101) Provided verbal/tactile cueing for activities related to strengthening, flexibility, endurance, ROM  for improvements in scapular, scapulothoracic and UE control with self care, reaching, carrying, lifting, house/yardwork, driving/computer work.    [] (15139) Provided verbal/tactile cueing for activities related to improving balance, coordination, kinesthetic sense, posture, motor skill, proprioception  to assist with  scapular, scapulothoracic and UE control with self care, reaching, carrying, lifting, house/yardwork, driving/computer work. Therapeutic Activities:    [] (41787 or 10183) Provided verbal/tactile cueing for activities related to improving balance, coordination, kinesthetic sense, posture, motor skill, proprioception and motor activation to allow for proper function of scapular, scapulothoracic and UE control with self care, carrying, lifting, driving/computer work.      Home Exercise Program:    [x] (54597) Reviewed/Progressed HEP activities related to strengthening, flexibility, endurance, ROM of scapular, scapulothoracic and UE control with self care, reaching, carrying, lifting, house/yardwork, driving/computer work  [] (83066) Reviewed/Progressed HEP activities related to improving balance, coordination, kinesthetic sense, posture, motor skill, proprioception of scapular, scapulothoracic and UE control with self care, reaching, carrying, lifting, house/yardwork, driving/computer work      Manual Treatments:  PROM / STM / Oscillations-Mobs:  G-I, II, III, IV (PA's, Inf., Post.)  [x] (75182) Provided manual therapy to mobilize soft tissue/joints of cervical/CT, scapular GHJ and UE for the purpose of modulating pain, promoting relaxation,  increasing ROM, reducing/eliminating soft tissue swelling/inflammation/restriction, improving soft tissue extensibility and allowing for proper ROM for normal function with self care, reaching, carrying, lifting, house/yardwork, driving/computer work    Modalities:  Cold pack 10'    Charges:  Timed Code Treatment Minutes: 31   Total Treatment Minutes: 50     [] EVAL (LOW) 68009 (typically 20 minutes face-to-face)  [] EVAL (MOD) 17002 (typically 30 minutes face-to-face)  [] EVAL (HIGH) 71325 (typically 45 minutes face-to-face)  [] RE-EVAL     [x] HV(93771) x  2   [] IONTO  [] NMR (70635) x      [] VASO  [x] Manual (20863) x  1    [] Other:  [] TA x       [] Mech Traction (56247)  [] ES(attended) (65472)      [] ES (un) (91684):     GOALS:  Therapist goals for Patient:   Short Term Goals: To be achieved in: 2 weeks  1. Independent in HEP and progression per patient tolerance, in order to prevent re-injury. 2. Patient will have a decrease in pain to facilitate improvement in movement, function, and ADLs as indicated by Functional Deficits. Long Term Goals: To be achieved in: 6-8 weeks  1. Disability index score of 25% or less for the DASH to assist with reaching prior level of function. (IE 54.81%)  2. Patient will demonstrate increased AROM to symmetrical within 5 degrees to allow for proper joint functioning as indicated by patients Functional Deficits. 3. Patient will demonstrate an increase in Strength to good scapular and core control, w/in 5lbs HHD for UE to allow for proper functional mobility as indicated by patients Functional Deficits. 4. Patient will return to increased physical and daily functional activities without increased symptoms or restriction. 5. Pt will report at least \"mild difficulty\" with question 8 of QuickDASH questionnaire by 6 weeks     Progression Towards Functional goals:  [] Patient is progressing as expected towards functional goals listed. [] Progression is slowed due to complexities listed. [] Progression has been slowed due to co-morbidities.   [x] Plan just implemented, too soon to assess goals progression  [] Other:     ASSESSMENT: Pt is responding well to all manual treatment techniques with increased muscle guarding at first. Gentle manual techniques performed and AAROM cane OH added for flexion range as well as press with cues for modifications and technique. Gentle isometrics added with cues for gentle pressure and added resistance without pain.     Return to Play: (if applicable)   []  Stage 1: Intro to Strength   []  Stage 2: Dynamic Strength and Intro to Plyometrics   []  Stage 3: Advanced Plyometrics and Intro to Throwing   []  Stage 4: Sport specific Training/Return to Sport     []  Ready to Return to Play, [email or web address]   []  Not Ready for Return to Sports   Comments:      Treatment/Activity Tolerance:  [x] Patient tolerated treatment well [] Patient limited by fatique  [] Patient limited by pain  [] Patient limited by other medical complications  [] Other:     Prognosis: [x] Good [] Fair  [] Poor    Patient Requires Follow-up: [x] Yes  [] No    PLAN:   [] Continue per plan of care [] Alter current plan (see comments)  [x] Plan of care initiated [] Hold pending MD visit [] Discharge    Electronically signed by: Kesha Muñiz PT

## 2017-11-17 ENCOUNTER — HOSPITAL ENCOUNTER (OUTPATIENT)
Dept: PHYSICAL THERAPY | Age: 67
Discharge: HOME | End: 2017-11-17
Admitting: ORTHOPAEDIC SURGERY

## 2017-11-20 ENCOUNTER — HOSPITAL ENCOUNTER (OUTPATIENT)
Dept: PHYSICAL THERAPY | Age: 67
Discharge: HOME | End: 2017-11-20
Admitting: ORTHOPAEDIC SURGERY

## 2017-11-20 NOTE — FLOWSHEET NOTE
soft tissue extensibility and allowing for proper ROM for normal function with self care, reaching, carrying, lifting, house/yardwork, driving/computer work    Modalities:  Cold pack 10'    Charges:  Timed Code Treatment Minutes: 40   Total Treatment Minutes: 50     [] EVAL (LOW) 38348 (typically 20 minutes face-to-face)  [] EVAL (MOD) 70470 (typically 30 minutes face-to-face)  [] EVAL (HIGH) 48109 (typically 45 minutes face-to-face)  [] RE-EVAL     [x] LY(00696) x  2   [] IONTO  [] NMR (39374) x      [] VASO  [x] Manual (39223) x  1    [] Other:  [] TA x       [] Mech Traction (56286)  [] ES(attended) (99698)      [] ES (un) (64633):     GOALS:  Therapist goals for Patient:   Short Term Goals: To be achieved in: 2 weeks  1. Independent in HEP and progression per patient tolerance, in order to prevent re-injury. 2. Patient will have a decrease in pain to facilitate improvement in movement, function, and ADLs as indicated by Functional Deficits. Long Term Goals: To be achieved in: 6-8 weeks  1. Disability index score of 25% or less for the DASH to assist with reaching prior level of function. (IE 54.81%)  2. Patient will demonstrate increased AROM to symmetrical within 5 degrees to allow for proper joint functioning as indicated by patients Functional Deficits. 3. Patient will demonstrate an increase in Strength to good scapular and core control, w/in 5lbs HHD for UE to allow for proper functional mobility as indicated by patients Functional Deficits. 4. Patient will return to increased physical and daily functional activities without increased symptoms or restriction. 5. Pt will report at least \"mild difficulty\" with question 8 of QuickDASH questionnaire by 6 weeks     Progression Towards Functional goals:  [x] Patient is progressing as expected towards functional goals listed. [] Progression is slowed due to complexities listed. [] Progression has been slowed due to co-morbidities.   [] Plan just

## 2017-11-28 ENCOUNTER — HOSPITAL ENCOUNTER (OUTPATIENT)
Dept: PHYSICAL THERAPY | Age: 67
Discharge: HOME | End: 2017-11-28
Admitting: ORTHOPAEDIC SURGERY

## 2017-11-28 NOTE — FLOWSHEET NOTE
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 Eliazar Hollis of care signed (Y/N):     Date of Patient follow up with Physician:     G-Code (if applicable):      Date G-Code Applied:         Progress Note: [x]  Yes  []  No  Next due by: Visit #10      Latex Allergy:  [x]NO      []YES  Preferred Language for Healthcare:   [x]English       []other:    Visit # Insurance Allowable   6 Medicare/C      Pain level:  5/10     SUBJECTIVE:  Pt is doing better each date. Still c/o lateral aspect of left shoulder with discomfort but mobility is improving each day.  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, s/p 10/23/17 TSA with biceps tenodesis     Exercises/Interventions:   Therapeutic Ex Sets/sec Reps Notes   UBE 4'  retro   Cane AAROM flex/press 10s 10ea OH 3lb bar   3 way Isomet 5s 0 No ext   T- band Row/pinch 3 10 red   T- band lower pinch      T- band ER activation      Supine SA punch      SL ER 1 20    Prone Rows/HAB/ext 2 10    Seat Table slides/Wall Slides 10s 10ea Flex only    Seated HH  Depression      No Money      Standing flex/scap      Standing Punch      Lawnmower                        Manual Intervention      Shld /GH Mobs 5     Post Cap mobs      Thoracic/Rib manipualtion      CT MT/Mobs      PROM MT 10     Elbow mobs            NMR re-education T-spine Ext      GH depress/compress      Scap/GH NMR 5s 30 multiangle submaximal isometrics   Body blade      Wall ball roll      Wall Ball bounce      Ball drops      Nancy Scap Bio          Therapeutic Exercise and NMR EXR  [x] (12974) Provided verbal/tactile cueing for activities related to strengthening, flexibility, endurance, ROM  for improvements in scapular, scapulothoracic and UE control with self care, reaching, carrying, lifting, house/yardwork, driving/computer work.    [] (69627) Provided verbal/tactile cueing for activities related to improving balance, coordination, kinesthetic sense, posture, motor skill, proprioception  to assist with  scapular, scapulothoracic and UE control with self care, reaching, carrying, lifting, house/yardwork, driving/computer work. Therapeutic Activities:    [] (45726 or 16845) Provided verbal/tactile cueing for activities related to improving balance, coordination, kinesthetic sense, posture, motor skill, proprioception and motor activation to allow for proper function of scapular, scapulothoracic and UE control with self care, carrying, lifting, driving/computer work.      Home Exercise Program:    [x] (72296) Reviewed/Progressed HEP activities related to strengthening, flexibility, endurance, ROM of scapular, scapulothoracic and UE control with self care, reaching, carrying, lifting, house/yardwork, driving/computer work  [] (73486) Reviewed/Progressed HEP activities related to improving balance, coordination, kinesthetic sense, posture, motor skill, proprioception of scapular, scapulothoracic and UE control with self care, reaching, carrying, lifting, house/yardwork, driving/computer work      Manual Treatments:  PROM / STM / Oscillations-Mobs:  G-I, II, III, IV (PA's, Inf., Post.)  [x] (28389) Provided manual therapy to mobilize soft tissue/joints of cervical/CT, scapular GHJ and UE for the purpose of modulating pain, promoting relaxation,  increasing ROM,

## 2017-12-01 ENCOUNTER — OFFICE VISIT (OUTPATIENT)
Dept: ORTHOPEDIC SURGERY | Age: 67
End: 2017-12-01

## 2017-12-01 ENCOUNTER — HOSPITAL ENCOUNTER (OUTPATIENT)
Dept: OTHER | Age: 67
Discharge: HOME | End: 2017-12-31
Attending: ORTHOPAEDIC SURGERY | Admitting: ORTHOPAEDIC SURGERY

## 2017-12-01 DIAGNOSIS — M19.90 ARTHRITIS: Primary | ICD-10-CM

## 2017-12-01 DIAGNOSIS — Z96.612 STATUS POST TOTAL REPLACEMENT OF LEFT SHOULDER: ICD-10-CM

## 2017-12-01 PROCEDURE — 99024 POSTOP FOLLOW-UP VISIT: CPT | Performed by: PHYSICIAN ASSISTANT

## 2017-12-01 RX ORDER — CEPHALEXIN 500 MG/1
500 CAPSULE ORAL 4 TIMES DAILY
Qty: 40 CAPSULE | Refills: 0 | Status: SHIPPED | OUTPATIENT
Start: 2017-12-01 | End: 2017-12-11

## 2017-12-05 ENCOUNTER — HOSPITAL ENCOUNTER (OUTPATIENT)
Dept: PHYSICAL THERAPY | Age: 67
Discharge: HOME | End: 2017-12-05
Admitting: ORTHOPAEDIC SURGERY

## 2017-12-05 NOTE — FLOWSHEET NOTE
depress/compress      Scap/GH NMR 5s 30 multiangle submaximal isometrics   Body blade npv     Wall ball roll npv     Wall Ball bounce      Ball drops      Nancy Scap Bio          Therapeutic Exercise and NMR EXR  [x] (11639) Provided verbal/tactile cueing for activities related to strengthening, flexibility, endurance, ROM  for improvements in scapular, scapulothoracic and UE control with self care, reaching, carrying, lifting, house/yardwork, driving/computer work.    [] (59190) Provided verbal/tactile cueing for activities related to improving balance, coordination, kinesthetic sense, posture, motor skill, proprioception  to assist with  scapular, scapulothoracic and UE control with self care, reaching, carrying, lifting, house/yardwork, driving/computer work. Therapeutic Activities:    [] (85564 or 25935) Provided verbal/tactile cueing for activities related to improving balance, coordination, kinesthetic sense, posture, motor skill, proprioception and motor activation to allow for proper function of scapular, scapulothoracic and UE control with self care, carrying, lifting, driving/computer work.      Home Exercise Program:    [x] (47132) Reviewed/Progressed HEP activities related to strengthening, flexibility, endurance, ROM of scapular, scapulothoracic and UE control with self care, reaching, carrying, lifting, house/yardwork, driving/computer work  [] (84984) Reviewed/Progressed HEP activities related to improving balance, coordination, kinesthetic sense, posture, motor skill, proprioception of scapular, scapulothoracic and UE control with self care, reaching, carrying, lifting, house/yardwork, driving/computer work      Manual Treatments:  PROM / STM / Oscillations-Mobs:  G-I, II, III, IV (PA's, Inf., Post.)  [x] (48659) Provided manual therapy to mobilize soft tissue/joints of cervical/CT, scapular GHJ and UE for the purpose of modulating pain, promoting relaxation,  increasing ROM, reducing/eliminating

## 2017-12-08 ENCOUNTER — HOSPITAL ENCOUNTER (OUTPATIENT)
Dept: PHYSICAL THERAPY | Age: 67
Discharge: HOME | End: 2017-12-08
Admitting: ORTHOPAEDIC SURGERY

## 2017-12-18 ENCOUNTER — HOSPITAL ENCOUNTER (OUTPATIENT)
Dept: PHYSICAL THERAPY | Age: 67
Discharge: HOME | End: 2017-12-18
Admitting: ORTHOPAEDIC SURGERY

## 2017-12-22 ENCOUNTER — HOSPITAL ENCOUNTER (OUTPATIENT)
Dept: PHYSICAL THERAPY | Age: 67
Discharge: HOME | End: 2017-12-22
Admitting: ORTHOPAEDIC SURGERY

## 2017-12-22 NOTE — FLOWSHEET NOTE
[address] [address] [facility], [place] [address]  Phone: [phone] Fax: [phone]      Physical Therapy Daily Treatment Note  Date:  2017    Patient Name:  Blue Childers    :  1950  MRN: [id number]  Restrictions/Precautions:    Medical/Treatment Diagnosis Information:  · Diagnosis: s/p L shoulder  · Treatment Diagnosis: status post total replacement of left shoulder Z96.612, C12.88  Insurance/Certification information:  PT Insurance Information: Bertin Stuart 142  Physician Information:  Referring Practitioner: Dr. Brianne Sandra of care signed (Y/N):     Date of Patient follow up with Physician:     G-Code (if applicable):      Date G-Code Applied:         Progress Note: [x]  Yes  []  No  Next due by: Visit #10      Latex Allergy:  [x]NO      []YES  Preferred Language for Healthcare:   [x]English       []other:    Visit # Insurance Allowable   10 Medicare/[facility] 1807.     Pain level:  10     SUBJECTIVE:  Pt reports that his shoulder is tight since he hasn't been in in a week. States that he may have done too much changing floor mat in car.    OBJECTIVE: gentle ER   Observation: s/p 8 week  Test measurements:      RESTRICTIONS/PRECAUTIONS: triple by-pass ', s/p 10/23/17 TSA with biceps tenodesis     Exercises/Interventions:   Therapeutic Ex Sets/sec Reps Notes   UBE 4'  retro   Cane AAROM flex/press 10s 10ea OH 3lb bar   3 way Isomet T- band Row/pinch 0 10 Blue    T- band lower pinch 0 10 blue   T- band ER activation      Supine flex 2 10 21oz ball   Supine horz abd 2 10 No weight   Supine SA punch 2 20 3lb   SL ER 5s 20 21oz ball / 0lb,1lb   Prone Rows/HAB/ext 2 10 2lb   Seat Table slides/Wall Slides 10s 10ea Flex only    Seated HH  Depression      No Money      Standing flex/scap 0 10 @ wall for posture   Standing Punch      Lawnmower 2 10 1lb                     Manual Intervention      Shld /GH Mobs 5     Post Cap mobs Thoracic/Rib manipualtion      CT MT/Mobs      PROM MT 8     Elbow mobs            NMR re-education      T-spine Ext      GH depress/compress      Scap/GH NMR 5s 30 multiangle submaximal isometrics   Body blade 20 5 ER/IR   Wall ball roll 2 20 White    Wall Ball bounce      Ball drops      Nancy Scap Bio          Therapeutic Exercise and NMR EXR  [x] (16800) Provided verbal/tactile cueing for activities related to strengthening, flexibility, endurance, ROM  for improvements in scapular, scapulothoracic and UE control with self care, reaching, carrying, lifting, house/yardwork, driving/computer work.    [] (08988) Provided verbal/tactile cueing for activities related to improving balance, coordination, kinesthetic sense, posture, motor skill, proprioception  to assist with  scapular, scapulothoracic and UE control with self care, reaching, carrying, lifting, house/yardwork, driving/computer work. Therapeutic Activities:    [] (83358 or 57832) Provided verbal/tactile cueing for activities related to improving balance, coordination, kinesthetic sense, posture, motor skill, proprioception and motor activation to allow for proper function of scapular, scapulothoracic and UE control with self care, carrying, lifting, driving/computer work.      Home Exercise Program:    [x] (70685) Reviewed/Progressed HEP activities related to strengthening, flexibility, endurance, ROM of scapular, scapulothoracic and UE control with self care, reaching, carrying, lifting, house/yardwork, driving/computer work  [] (19313) Reviewed/Progressed HEP activities related to improving balance, coordination, kinesthetic sense, posture, motor skill, proprioception of scapular, scapulothoracic and UE control with self care, reaching, carrying, lifting, house/yardwork, driving/computer work      Manual Treatments:  PROM / STM / Oscillations-Mobs:  G-I, II, III, IV (PA's, Inf., Post.)  [x] (56606) Provided manual therapy to mobilize soft

## 2018-01-01 ENCOUNTER — HOSPITAL ENCOUNTER (OUTPATIENT)
Dept: OTHER | Age: 68
Discharge: HOME | End: 2018-01-31
Attending: ORTHOPAEDIC SURGERY | Admitting: ORTHOPAEDIC SURGERY

## 2018-01-03 ENCOUNTER — HOSPITAL ENCOUNTER (OUTPATIENT)
Dept: PHYSICAL THERAPY | Age: 68
Discharge: HOME | End: 2018-01-03
Admitting: ORTHOPAEDIC SURGERY

## 2018-01-10 ENCOUNTER — HOSPITAL ENCOUNTER (OUTPATIENT)
Dept: PHYSICAL THERAPY | Age: 68
Discharge: HOME | End: 2018-01-10
Admitting: ORTHOPAEDIC SURGERY

## 2018-01-10 NOTE — FLOWSHEET NOTE
[address] [place], [place] [address]  Phone: [phone] Fax: [phone]        Physical Therapy Re-Certification Plan of Care/MD UPDATE      Dear Referring Practitioner: Dr. Tony Mckenna,    We had the pleasure of treating the following patient for physical therapy services at [address] [place] [address]. A summary of our findings can be found in the updated assessment below. This includes our plan of care. If you have any questions or concerns regarding these findings, please do not hesitate to contact me at the office phone number checked above.   Thank you for the referral.     Physician Signature:________________________________Date:__________________  By signing above (or electronic signature), therapists plan is approved by physician    Date Range Of Visits: 2017 thru 1/10/2018  Total Visits to Date: 6  Overall Response to Treatment:   [x]Patient is responding well to treatment and improvement is noted with regards  to goals   []Patient should continue to improve in reasonable time if they continue HEP   []Patient has plateaued and is no longer responding to skilled PT intervention    []Patient is getting worse and would benefit from return to referring MD   []Patient unable to adhere to initial POC   [x]Other:Progress has be limited as patient has been unable to come to therapy since 2017                     Physical Therapy Daily Treatment Note  Date:  1/10/2018    Patient Name:  Ketty Israel    :  1950  MRN: [id number]  Restrictions/Precautions:    Medical/Treatment Diagnosis Information:  · Diagnosis: s/p L shoulder  · Treatment Diagnosis: status post total replacement of left shoulder Z96.612, S58.25  Insurance/Certification information:  PT Insurance Information: Bertin Sturat 142  Physician Information:  Referring Practitioner: Dr. Rene Webster of care signed (Y/N):     Date of Patient follow up with Physician:     G-Code (if applicable):      Date G-Code Applied:  1/10/2018  PT G-Codes  Functional Assessment Tool Used: Emily Braxton  Score: 39%  Functional Limitation: Carrying, moving and handling objects  Carrying, Moving and Handling Objects Current Status (): At least 20 percent but less than 40 percent impaired, limited or restricted  Carrying, Moving and Handling Objects Goal Status (): At least 20 percent but less than 40 percent impaired, limited or restricted    Progress Note: [x]  Yes  []  No  Next due by: Visit #10      Latex Allergy:  [x]NO      []YES  Preferred Language for Healthcare:   [x]English       []other:    Visit # Insurance Allowable   11 Medicare/[facility] [id number]     Pain level:  4/10     SUBJECTIVE:  Patient reports that with the holidays hasn't been able to get into therapy. Notes that he has been having pain in back/top side of shoulder and down toward elbow. States has been limited in all of his movements.       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, s/p 10/23/17 TSA with biceps tenodesis     Exercises/Interventions:   Therapeutic Ex: 24 mins Sets/sec Reps Notes   UBE 5'  retro   Cane AAROM flex/press/ER 10s 10ea OH 3lb bar   3 way Isomet T- band Row/pinch 2 10 Blue    T- band lower pinch 2 10 blue   T- band ER activation      Supine flex Supine horz abd Supine SA punch SL ER Prone Rows/HAB/ext Wall Slides 10s 10ea Flex only    Seated HH  Depression      No Money      Standing flex/scap Standing Punch      Lawnmower                   Manual Intervention: 21 mins      Shld /GH of QuickDASH questionnaire by 6 weeks - 50% MET    Progression Towards Functional goals:  [x] Patient is progressing as expected towards functional goals listed. [] Progression is slowed due to complexities listed. [] Progression has been slowed due to co-morbidities. [] Plan just implemented, too soon to assess goals progression  [] Other:     ASSESSMENT: Pt has only been seen for 11 visits of PT since L TSA. Patient arrived to therapy today with reports of pain in posterior shoulder, near [facility] joint and into anterior forearm. Reproducible pain with resisted shoulder flexion, abduction and ER, consistent with mild impingement. Patient noted to have limitations in inferior joint capsule as well as with ER. With mobilization, patient demonstrated improvement in shoulder flexion to 150 degrees and decreased discomfort in shoulder and arm. Performed IASTM to UT and anterior shoulder joint with patient reporting no pain at conclusion. Patient able to then tolerate resistance with all shoulder movements for MMT and no longer having pain with asterisk sign. Patient strength significantly limited still in L shoulder, as well as periscapular strength and patient with decreased ability to control scapular retraction, likely contributing to mild impingement type symptoms. Patient will require continued PT services for ROM/stretching, shoulder and periscapular strengthening, proprioceptive training and education for posture and appropriate muscle activation. Will require continued PT 2x week for 5 weeks. Updated HEP provided. Discussed importance of coming to therapy to further progress patient for successful outcomes/recovery.     Return to Play: (if applicable)   []  Stage 1: Intro to Strength   []  Stage 2: Dynamic Strength and Intro to Plyometrics   []  Stage 3: Advanced Plyometrics and Intro to Throwing   []  Stage 4: Sport specific Training/Return to Sport     []  Ready to Return to Play, Meets All Above Stages   [] Not Ready for Return to Sports   Comments:      Treatment/Activity Tolerance:  [x] Patient tolerated treatment well [] Patient limited by fatique  [] Patient limited by pain  [] Patient limited by other medical complications  [] Other:     Prognosis: [x] Good [] Fair  [] Poor    Patient Requires Follow-up: [x] Yes  [] No    PLAN: Continue 2x week for 5 weeks  [x] Continue per plan of care [] Alter current plan (see comments)  [x] Plan of care initiated [] Hold pending MD visit [] Discharge    Electronically signed by: Gita Castleman, [id number]

## 2018-01-18 ENCOUNTER — HOSPITAL ENCOUNTER (OUTPATIENT)
Dept: PHYSICAL THERAPY | Age: 68
Discharge: HOME | End: 2018-01-18
Admitting: ORTHOPAEDIC SURGERY

## 2018-01-18 NOTE — FLOWSHEET NOTE
of QuickDASH questionnaire by 6 weeks - 50% MET    Progression Towards Functional goals:  [x] Patient is progressing as expected towards functional goals listed. [] Progression is slowed due to complexities listed. [] Progression has been slowed due to co-morbidities. [] Plan just implemented, too soon to assess goals progression  [] Other:     ASSESSMENT: Patient with less anterior shoulder pain at beginning of shoulder, no reports at conclusion. Progressing strength well; continues to be limited in ER/IR motions.      Return to Play: (if applicable)   []  Stage 1: Intro to Strength   []  Stage 2: Dynamic Strength and Intro to Plyometrics   []  Stage 3: Advanced Plyometrics and Intro to Throwing   []  Stage 4: Sport specific Training/Return to Sport     []  Ready to Return to Play, [employer]   []  Not Ready for Return to Sports   Comments:      Treatment/Activity Tolerance:  [x] Patient tolerated treatment well [] Patient limited by fatique  [] Patient limited by pain  [] Patient limited by other medical complications  [] Other:     Prognosis: [x] Good [] Fair  [] Poor    Patient Requires Follow-up: [x] Yes  [] No    PLAN: Continue with ROM and strengthening of L shoulder  [x] Continue per plan of care [] Alter current plan (see comments)  [] Plan of care initiated [] Hold pending MD visit [] Discharge    Electronically signed by: ROSE [id number]

## 2018-01-22 ENCOUNTER — HOSPITAL ENCOUNTER (OUTPATIENT)
Dept: PHYSICAL THERAPY | Age: 68
Discharge: HOME | End: 2018-01-22
Admitting: ORTHOPAEDIC SURGERY

## 2018-01-22 NOTE — FLOWSHEET NOTE
[address] [place] [address] [place] [address]  Phone: [phone] Fax: [phone]       Physical Therapy Daily Treatment Note  Date:  2018    Patient Name:  Sharifa Boles    :  1950  MRN: [id number]  Restrictions/Precautions:    Medical/Treatment Diagnosis Information:  · Diagnosis: s/p L shoulder  · Treatment Diagnosis: status post total replacement of left shoulder Z96.612, Y68.25  Insurance/Certification information:  PT Insurance Information: Bertin Stuart 142  Physician Information:  Referring Practitioner: Dr. Laughlin [place] of care signed (Y/N):     Date of Patient follow up with Physician:     G-Code (if applicable):      Date G-Code Applied:  1/10/2018       Progress Note: [x]  Yes  []  No  Next due by: Visit #10      Latex Allergy:  [x]NO      []YES  Preferred Language for Healthcare:   [x]English       []other:    Visit # Insurance Allowable   13 Medicare/[facility] 1807     Pain level:  0-1/10     SUBJECTIVE:  Patient states that he has been working the shoulder pretty good over the last few days. Notes that he still has some difficulty with raising arm at times. Mild soreness in anterior shoulder.        OBJECTIVE:    Observation: s/p 10 weeks  Test measurements:      RESTRICTIONS/PRECAUTIONS: triple by-pass '01, s/p 10/23/17 TSA with biceps tenodesis     Exercises/Interventions:   Therapeutic Ex: 32 mins Sets/sec Reps Notes   UBE 5'  retro   Cane AAROM flex/press/ER 10s 10ea OH 3lb bar   3 way Isomet T- band Row/pinch 2 10 Blue    T- band lower pinch 2 10 blue   T- band ER/IR activation 2 10 blue   Supine flex 2 10 2#l   Supine horz abd 2 10 2#   Supine SA punch 5 20 3lb   SL ER 5s 20 2#   Prone Rows/HAB/ext 2 10 2lb   Wall Slides 10s 10ea Flex only    Seated HH  Depression      No Money      Standing flex/scap 2 10 1#   Standing Punch      Lawnmower       SL shoulder abduction 2 10 2#   Wall push up 2 10 without increased symptoms or restriction. -25% MET  5. Pt will report at least \"mild difficulty\" with question 8 of QuickDASH questionnaire by 6 weeks - 50% MET    Progression Towards Functional goals:  [x] Patient is progressing as expected towards functional goals listed. [] Progression is slowed due to complexities listed. [] Progression has been slowed due to co-morbidities. [] Plan just implemented, too soon to assess goals progression  [] Other:     ASSESSMENT: Patient with less anterior shoulder pain at beginning of session; no reports at conclusion. Improved activation with OH flexion after mobilization and stretching. Improving ER/IR motions. Progressed compressive force exercises today; needing cues for improved form and decreased utilization of UT. Continues to progress well.       Return to Play: (if applicable)   []  Stage 1: Intro to Strength   []  Stage 2: Dynamic Strength and Intro to Plyometrics   []  Stage 3: Advanced Plyometrics and Intro to Throwing   []  Stage 4: Sport specific Training/Return to Sport     []  Ready to Return to Play, [employer]   []  Not Ready for Return to Sports   Comments:      Treatment/Activity Tolerance:  [x] Patient tolerated treatment well [] Patient limited by fatique  [] Patient limited by pain  [] Patient limited by other medical complications  [] Other:     Prognosis: [x] Good [] Fair  [] Poor    Patient Requires Follow-up: [x] Yes  [] No    PLAN: Continue with ROM and strengthening of L shoulder  [x] Continue per plan of care [] Alter current plan (see comments)  [] Plan of care initiated [] Hold pending MD visit [] Discharge    Electronically signed by: Rosales Mercado, [id number]

## 2018-01-23 ENCOUNTER — OFFICE VISIT (OUTPATIENT)
Dept: ORTHOPEDIC SURGERY | Age: 68
End: 2018-01-23

## 2018-01-23 VITALS — HEIGHT: 67 IN | BODY MASS INDEX: 27.85 KG/M2 | WEIGHT: 177.47 LBS

## 2018-01-23 DIAGNOSIS — R52 PAIN: Primary | ICD-10-CM

## 2018-01-23 PROCEDURE — 99024 POSTOP FOLLOW-UP VISIT: CPT | Performed by: ORTHOPAEDIC SURGERY

## 2018-01-26 ENCOUNTER — HOSPITAL ENCOUNTER (OUTPATIENT)
Dept: PHYSICAL THERAPY | Age: 68
Discharge: HOME | End: 2018-01-26
Admitting: ORTHOPAEDIC SURGERY

## 2018-01-30 ENCOUNTER — HOSPITAL ENCOUNTER (OUTPATIENT)
Dept: PHYSICAL THERAPY | Age: 68
Discharge: HOME | End: 2018-01-30
Admitting: ORTHOPAEDIC SURGERY

## 2018-01-30 NOTE — FLOWSHEET NOTE
[address] [place] [address]  Phone: [phone] Fax: [phone]       Physical Therapy Daily Treatment Note  Date:  2018    Patient Name:  Prisca Ignacio    :  1950  MRN: [id number]  Restrictions/Precautions:    Medical/Treatment Diagnosis Information:  · Diagnosis: s/p L shoulder  · Treatment Diagnosis: status post total replacement of left shoulder Z96.612, Q22.93  Insurance/Certification information:  PT Insurance Information: [employer] [id number]  Physician Information:  Referring Practitioner: Dr. Bri Esquivel of care signed (Y/N):     Date of Patient follow up with Physician:     G-Code (if applicable):      Date G-Code Applied:  1/10/2018       Progress Note: []  Yes  [x]  No  Next due by: Visit #10      Latex Allergy:  [x]NO      []YES  Preferred Language for Healthcare:   [x]English       []other:    Visit # Insurance Allowable   15 Medicare/C 1807     Pain level:  0-1/10     SUBJECTIVE:  Patient states that he has been working the shoulder pretty good over the last few days. Went to MD on Tuesday and states that he is pleased with progress. Patient running approx 10 min late today.       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 ', s/p 10/23/17 TSA with biceps tenodesis     Exercises/Interventions:   Therapeutic Ex: 40 mins Sets/sec Reps Notes   UBE 5'  retro   Cane AAROM flex/press/ER 10s 10ea OH 5 lb bar   3 way Isomet T- band Row/pinch T- band lower pinch T- band ER/IR activation Supine flex 2 10 4#   Supine horz abd 2 10 3#   Supine SA punch 5 20 4lb   SL ER 5s 20 4#   Prone Rows/HAB/ext 2 10 3lb/3lb/2lb   Wall Slides/wall wash 10s 10ea    Seated HH  Depression with lift 1 10    No Money      Standing flex/scap 2 10 1#- mirror for form   Standing Punch      Lawnmower Snow shayla against wall 2 10    SL shoulder abduction 2 10 PROM / STM / Oscillations-Mobs:  G-I, II, III, IV (PA's, Inf., Post.)  [x] (84421) Provided manual therapy to mobilize soft tissue/joints of cervical/CT, scapular GHJ and UE for the purpose of modulating pain, promoting relaxation,  increasing ROM, reducing/eliminating soft tissue swelling/inflammation/restriction, improving soft tissue extensibility and allowing for proper ROM for normal function with self care, reaching, carrying, lifting, house/yardwork, driving/computer work    Modalities: Ice to go    Charges:  Timed Code Treatment Minutes: 55   Total Treatment Minutes: 60     [] EVAL (LOW) 88390 (typically 20 minutes face-to-face)  [] EVAL (MOD) 43280 (typically 30 minutes face-to-face)  [] EVAL (HIGH) 61469 (typically 45 minutes face-to-face)  [] RE-EVAL     [x] HF(63023) x  3   [] IONTO  [] NMR (44116) x      [] VASO  [x] Manual (34841) x  1    [] Other:  [] TA x       [] Mech Traction (10176)  [] ES(attended) (71504)      [] ES (un) (70775):     GOALS:  Therapist goals for Patient:   Short Term Goals: To be achieved in: 2 weeks  1. Independent in HEP and progression per patient tolerance, in order to prevent re-injury. -75% MET  2. Patient will have a decrease in pain to facilitate improvement in movement, function, and ADLs as indicated by Functional Deficits. -50% MET    Long Term Goals: To be achieved in: 6-8 weeks  1. Disability index score of 25% or less for the DASH to assist with reaching prior level of function. (IE 54.81%)- progressing  2. Patient will demonstrate increased AROM to symmetrical within 5 degrees to allow for proper joint functioning as indicated by patients Functional Deficits. - 75% MET  3. Patient will demonstrate an increase in Strength to good scapular and core control, w/in 5lbs HHD for UE to allow for proper functional mobility as indicated by patients Functional Deficits. -40% MET  4.  Patient will return to increased physical and daily functional activities without increased

## 2018-02-01 ENCOUNTER — HOSPITAL ENCOUNTER (OUTPATIENT)
Dept: OTHER | Age: 68
Discharge: HOME | End: 2018-02-28
Attending: ORTHOPAEDIC SURGERY | Admitting: ORTHOPAEDIC SURGERY

## 2018-02-14 ENCOUNTER — HOSPITAL ENCOUNTER (OUTPATIENT)
Dept: PHYSICAL THERAPY | Age: 68
Discharge: HOME | End: 2018-02-15
Admitting: ORTHOPAEDIC SURGERY

## 2018-02-21 ENCOUNTER — HOSPITAL ENCOUNTER (OUTPATIENT)
Dept: PHYSICAL THERAPY | Age: 68
Discharge: HOME | End: 2018-02-22
Admitting: ORTHOPAEDIC SURGERY

## 2018-02-21 NOTE — FLOWSHEET NOTE
[address] [address] [facility]  Phone: [phone] Fax: [phone]       Physical Therapy Daily Treatment Note  Date:  2018    Patient Name:  Linda Whaley    :  1950  MRN: [id number]  Restrictions/Precautions:    Medical/Treatment Diagnosis Information:  · Diagnosis: s/p L shoulder  · Treatment Diagnosis: status post total replacement of left shoulder Z96.612, L86.32  Insurance/Certification information:  PT Insurance Information: [employer] [id number]  Physician Information:  Referring Practitioner: Dr. Vickey Rebollar of care signed (Y/N):     Date of Patient follow up with Physician:     G-Code (if applicable):      Date G-Code Applied:  1/10/2018       Progress Note: []  Yes  [x]  No  Next due by: Visit #10      Latex Allergy:  [x]NO      []YES  Preferred Language for Healthcare:   [x]English       []other:    Visit # Insurance Allowable   17 Medicare/[facility] 1807     Pain level:  0-1/10     SUBJECTIVE:  Patient states that he has been continuing to work on HEP and push using arm. States that he has used 5# weight and had increased soreness with this. Notes that he has occasional soreness in anterior shoulder.        OBJECTIVE:    Observation:   Test measurements:      RESTRICTIONS/PRECAUTIONS: triple by-pass '01, s/p 10/23/17 TSA with biceps tenodesis     Exercises/Interventions:   Therapeutic Ex: 30 mins Sets/sec Reps Notes   UBE 5'  retro   Cane AAROM flex/press/ER 10s 10ea OH 5 lb bar   3 way Isomet T- band Row/pinch T- band lower pinch T- band ER/IR activation Supine flex 2 10 4#   Supine horz abd 2 10 4#   Supine SA punch 5 20 4lb   SL ER 5s 20 4#   Prone Rows/HAB/ext 2 10 4lb/4lb/2lb   Wall Slides/wall wash 10s 10ea       No Money      Standing flex/scap 2 10 2#- mirror for form   Standing Punch      Lawnmower Snow shayla against wall 2 10    SL shoulder abduction 2 10 3#   Wall push up 2 10          Manual lifting, house/yardwork, driving/computer work      Manual Treatments:  PROM / STM / Oscillations-Mobs:  G-I, II, III, IV (PA's, Inf., Post.)  [x] (90919) Provided manual therapy to mobilize soft tissue/joints of cervical/CT, scapular GHJ and UE for the purpose of modulating pain, promoting relaxation,  increasing ROM, reducing/eliminating soft tissue swelling/inflammation/restriction, improving soft tissue extensibility and allowing for proper ROM for normal function with self care, reaching, carrying, lifting, house/yardwork, driving/computer work    Modalities: Ice to go    Charges:  Timed Code Treatment Minutes: 60   Total Treatment Minutes: 61     [] EVAL (LOW) 89893 (typically 20 minutes face-to-face)  [] EVAL (MOD) 12182 (typically 30 minutes face-to-face)  [] EVAL (HIGH) 04157 (typically 45 minutes face-to-face)  [] RE-EVAL     [x] HY(21645) x  2   [] IONTO  [x] NMR (27123) x  1   [] VASO  [x] Manual (65503) x  1    [] Other:  [] TA x       [] Mech Traction (76390)  [] ES(attended) (79495)      [] ES (un) (14775):     GOALS:  Therapist goals for Patient:   Short Term Goals: To be achieved in: 2 weeks  1. Independent in HEP and progression per patient tolerance, in order to prevent re-injury. -75% MET  2. Patient will have a decrease in pain to facilitate improvement in movement, function, and ADLs as indicated by Functional Deficits. -50% MET    Long Term Goals: To be achieved in: 6-8 weeks  1. Disability index score of 25% or less for the DASH to assist with reaching prior level of function. (IE 54.81%)- progressing  2. Patient will demonstrate increased AROM to symmetrical within 5 degrees to allow for proper joint functioning as indicated by patients Functional Deficits. - 75% MET  3. Patient will demonstrate an increase in Strength to good scapular and core control, w/in 5lbs HHD for UE to allow for proper functional mobility as indicated by patients Functional Deficits. -40% MET  4.  Patient will return

## 2018-02-26 ENCOUNTER — HOSPITAL ENCOUNTER (OUTPATIENT)
Dept: PHYSICAL THERAPY | Age: 68
Discharge: HOME | End: 2018-02-27
Admitting: ORTHOPAEDIC SURGERY

## 2018-02-26 DIAGNOSIS — Z96.612 STATUS POST TOTAL REPLACEMENT OF LEFT SHOULDER: Primary | ICD-10-CM

## 2018-02-26 RX ORDER — AMOXICILLIN 500 MG/1
CAPSULE ORAL
Qty: 4 CAPSULE | Refills: 0 | Status: SHIPPED | OUTPATIENT
Start: 2018-02-26

## 2018-02-26 NOTE — FLOWSHEET NOTE
UE control with self care, reaching, carrying, lifting, house/yardwork, driving/computer work      Manual Treatments:  PROM / STM / Oscillations-Mobs:  G-I, II, III, IV (Kaur, Inf., Post.)  [x] (34517) Provided manual therapy to mobilize soft tissue/joints of cervical/CT, scapular GHJ and UE for the purpose of modulating pain, promoting relaxation,  increasing ROM, reducing/eliminating soft tissue swelling/inflammation/restriction, improving soft tissue extensibility and allowing for proper ROM for normal function with self care, reaching, carrying, lifting, house/yardwork, driving/computer work    Modalities: Ice to go    Charges:  Timed Code Treatment Minutes: 60   Total Treatment Minutes: 61     [] EVAL (LOW) 58713 (typically 20 minutes face-to-face)  [] EVAL (MOD) 44909 (typically 30 minutes face-to-face)  [] EVAL (HIGH) 19577 (typically 45 minutes face-to-face)  [] RE-EVAL     [x] OQ(40019) x  2   [] IONTO  [x] NMR (40071) x  1   [] VASO  [x] Manual (27742) x  1    [] Other:  [] TA x       [] Mech Traction (48679)  [] ES(attended) (88109)      [] ES (un) (35158):     GOALS:  Therapist goals for Patient:   Short Term Goals: To be achieved in: 2 weeks  1. Independent in HEP and progression per patient tolerance, in order to prevent re-injury. -75% MET  2. Patient will have a decrease in pain to facilitate improvement in movement, function, and ADLs as indicated by Functional Deficits. -50% MET    Long Term Goals: To be achieved in: 6-8 weeks  1. Disability index score of 25% or less for the DASH to assist with reaching prior level of function. (IE 54.81%)- progressing  2. Patient will demonstrate increased AROM to symmetrical within 5 degrees to allow for proper joint functioning as indicated by patients Functional Deficits. - 75% MET  3.  Patient will demonstrate an increase in Strength to good scapular and core control, w/in 5lbs HHD for UE to allow for proper functional mobility as indicated by patients

## 2018-03-01 ENCOUNTER — HOSPITAL ENCOUNTER (OUTPATIENT)
Dept: OTHER | Age: 68
Discharge: HOME | End: 2018-03-31
Attending: ORTHOPAEDIC SURGERY | Admitting: ORTHOPAEDIC SURGERY

## 2018-03-06 ENCOUNTER — HOSPITAL ENCOUNTER (OUTPATIENT)
Dept: PHYSICAL THERAPY | Age: 68
Discharge: HOME | End: 2018-03-07
Admitting: ORTHOPAEDIC SURGERY

## 2018-03-15 ENCOUNTER — HOSPITAL ENCOUNTER (OUTPATIENT)
Dept: PHYSICAL THERAPY | Age: 68
Discharge: HOME | End: 2018-03-16
Admitting: ORTHOPAEDIC SURGERY

## 2018-03-15 NOTE — FLOWSHEET NOTE
and core control, w/in 5lbs HHD for UE to allow for proper functional mobility as indicated by patients Functional Deficits. -40% MET  4. Patient will return to increased physical and daily functional activities without increased symptoms or restriction. -25% MET  5. Pt will report at least \"mild difficulty\" with question 8 of QuickDASH questionnaire by 6 weeks - 50% MET    Progression Towards Functional goals:  [x] Patient is progressing as expected towards functional goals listed. [] Progression is slowed due to complexities listed. [] Progression has been slowed due to co-morbidities. [] Plan just implemented, too soon to assess goals progression  [] Other:     ASSESSMENT: Patient with improving scapular depression with OH elevation. Improved shoulder abduction s/p thoracic manipulation. Still with difficulty with inferior translation of HH with OH elevation. Patient with reports of shoulder feeling more loose at conclusion. Educated patient to continue with decreased resistance with increased reps and resting when form deteriorates. Improving overall scapular activation. Appropriately fatigued at conclusion.      Return to Play: (if applicable)   []  Stage 1: Intro to Strength   []  Stage 2: Dynamic Strength and Intro to Plyometrics   []  Stage 3: Advanced Plyometrics and Intro to Throwing   []  Stage 4: Sport specific Training/Return to Sport     []  Ready to Return to Play, [employer]   []  Not Ready for Return to Sports   Comments:      Treatment/Activity Tolerance:  [x] Patient tolerated treatment well [] Patient limited by fatique  [] Patient limited by pain  [] Patient limited by other medical complications  [] Other:     Prognosis: [x] Good [] Fair  [] Poor    Patient Requires Follow-up: [x] Yes  [] No    PLAN: Continue with ROM and strengthening of L shoulder  [x] Continue per plan of care [] Alter current plan (see comments)  [] Plan of care initiated [] Hold pending MD visit []

## 2018-03-22 ENCOUNTER — HOSPITAL ENCOUNTER (OUTPATIENT)
Dept: PHYSICAL THERAPY | Age: 68
Discharge: HOME | End: 2018-03-23
Admitting: ORTHOPAEDIC SURGERY

## 2018-04-01 ENCOUNTER — HOSPITAL ENCOUNTER (OUTPATIENT)
Dept: OTHER | Age: 68
Discharge: HOME | End: 2018-04-30
Attending: ORTHOPAEDIC SURGERY | Admitting: ORTHOPAEDIC SURGERY

## 2018-04-13 ENCOUNTER — OFFICE VISIT (OUTPATIENT)
Dept: ORTHOPEDIC SURGERY | Age: 68
End: 2018-04-13

## 2018-04-13 VITALS — HEIGHT: 67 IN | BODY MASS INDEX: 27.85 KG/M2 | WEIGHT: 177.47 LBS

## 2018-04-13 DIAGNOSIS — Z96.612 STATUS POST TOTAL REPLACEMENT OF LEFT SHOULDER: Primary | ICD-10-CM

## 2018-04-13 PROCEDURE — G8427 DOCREV CUR MEDS BY ELIG CLIN: HCPCS | Performed by: PHYSICIAN ASSISTANT

## 2018-04-13 PROCEDURE — 99213 OFFICE O/P EST LOW 20 MIN: CPT | Performed by: PHYSICIAN ASSISTANT

## 2018-04-13 PROCEDURE — 4040F PNEUMOC VAC/ADMIN/RCVD: CPT | Performed by: PHYSICIAN ASSISTANT

## 2018-04-13 PROCEDURE — G8598 ASA/ANTIPLAT THER USED: HCPCS | Performed by: PHYSICIAN ASSISTANT

## 2018-04-13 PROCEDURE — 1036F TOBACCO NON-USER: CPT | Performed by: PHYSICIAN ASSISTANT

## 2018-04-13 PROCEDURE — G8419 CALC BMI OUT NRM PARAM NOF/U: HCPCS | Performed by: PHYSICIAN ASSISTANT

## 2018-04-13 PROCEDURE — 1123F ACP DISCUSS/DSCN MKR DOCD: CPT | Performed by: PHYSICIAN ASSISTANT

## 2018-04-13 PROCEDURE — 3017F COLORECTAL CA SCREEN DOC REV: CPT | Performed by: PHYSICIAN ASSISTANT
